# Patient Record
Sex: MALE | Race: WHITE | NOT HISPANIC OR LATINO | URBAN - METROPOLITAN AREA
[De-identification: names, ages, dates, MRNs, and addresses within clinical notes are randomized per-mention and may not be internally consistent; named-entity substitution may affect disease eponyms.]

---

## 2017-05-03 VITALS
HEIGHT: 69 IN | DIASTOLIC BLOOD PRESSURE: 70 MMHG | TEMPERATURE: 98 F | HEART RATE: 47 BPM | SYSTOLIC BLOOD PRESSURE: 167 MMHG | OXYGEN SATURATION: 98 % | WEIGHT: 173.94 LBS | RESPIRATION RATE: 16 BRPM

## 2017-05-03 NOTE — ASU PATIENT PROFILE, ADULT - PMH
BPH (benign prostatic hypertrophy)    HLD (hyperlipidemia)    HTN (hypertension)    Kidney stone BPH (benign prostatic hypertrophy)    HTN (hypertension)    Kidney stone

## 2017-05-04 ENCOUNTER — OUTPATIENT (OUTPATIENT)
Dept: OUTPATIENT SERVICES | Facility: HOSPITAL | Age: 67
LOS: 1 days | Discharge: HOME | End: 2017-05-04
Payer: COMMERCIAL

## 2017-05-04 VITALS
HEART RATE: 59 BPM | DIASTOLIC BLOOD PRESSURE: 67 MMHG | OXYGEN SATURATION: 99 % | RESPIRATION RATE: 14 BRPM | SYSTOLIC BLOOD PRESSURE: 159 MMHG

## 2017-05-04 DIAGNOSIS — Z98.890 OTHER SPECIFIED POSTPROCEDURAL STATES: Chronic | ICD-10-CM

## 2017-05-04 DIAGNOSIS — Z87.442 PERSONAL HISTORY OF URINARY CALCULI: Chronic | ICD-10-CM

## 2017-05-04 PROCEDURE — 86850 RBC ANTIBODY SCREEN: CPT

## 2017-05-04 PROCEDURE — 86900 BLOOD TYPING SEROLOGIC ABO: CPT

## 2017-05-04 PROCEDURE — 88305 TISSUE EXAM BY PATHOLOGIST: CPT

## 2017-05-04 PROCEDURE — 52204 CYSTOSCOPY W/BIOPSY(S): CPT

## 2017-05-04 PROCEDURE — 86901 BLOOD TYPING SEROLOGIC RH(D): CPT

## 2017-05-04 PROCEDURE — 87086 URINE CULTURE/COLONY COUNT: CPT

## 2017-05-04 RX ORDER — SODIUM CHLORIDE 9 MG/ML
1000 INJECTION, SOLUTION INTRAVENOUS
Qty: 0 | Refills: 0 | Status: DISCONTINUED | OUTPATIENT
Start: 2017-05-04 | End: 2017-05-04

## 2017-05-04 RX ORDER — DOCUSATE SODIUM 100 MG
1 CAPSULE ORAL
Qty: 30 | Refills: 0 | OUTPATIENT
Start: 2017-05-04 | End: 2017-05-19

## 2017-05-04 RX ORDER — OXYCODONE HYDROCHLORIDE 5 MG/1
1 TABLET ORAL
Qty: 12 | Refills: 0 | OUTPATIENT
Start: 2017-05-04

## 2017-05-04 RX ORDER — OXYCODONE HYDROCHLORIDE 5 MG/1
5 TABLET ORAL ONCE
Qty: 0 | Refills: 0 | Status: DISCONTINUED | OUTPATIENT
Start: 2017-05-04 | End: 2017-05-04

## 2017-05-04 RX ORDER — AZTREONAM 2 G
1 VIAL (EA) INJECTION
Qty: 10 | Refills: 0 | OUTPATIENT
Start: 2017-05-04 | End: 2017-05-09

## 2017-05-04 NOTE — PACU DISCHARGE NOTE - COMMENTS
discharge instructions given to patient,PACU discharge criteria has met  ortega bag changed to leg bag  emphasized that needs to see primary doctor in am to remove ortega  discharged to lobby via wheelchair with wife as escort

## 2017-05-04 NOTE — BRIEF OPERATIVE NOTE - OPERATION/FINDINGS
small lesions throughout bladder that seemed like fat deposits. 3 lesions biopsied with cold cup biopsy. 10 bugbey used for fulguration

## 2017-05-05 LAB — SURGICAL PATHOLOGY STUDY: SIGNIFICANT CHANGE UP

## 2017-05-06 LAB
CULTURE RESULTS: NO GROWTH — SIGNIFICANT CHANGE UP
SPECIMEN SOURCE: SIGNIFICANT CHANGE UP

## 2017-05-09 DIAGNOSIS — I10 ESSENTIAL (PRIMARY) HYPERTENSION: ICD-10-CM

## 2017-05-09 DIAGNOSIS — Z88.1 ALLERGY STATUS TO OTHER ANTIBIOTIC AGENTS STATUS: ICD-10-CM

## 2017-05-09 DIAGNOSIS — N30.81 OTHER CYSTITIS WITH HEMATURIA: ICD-10-CM

## 2017-05-09 DIAGNOSIS — Z87.442 PERSONAL HISTORY OF URINARY CALCULI: ICD-10-CM

## 2018-05-15 PROBLEM — N40.0 BENIGN PROSTATIC HYPERPLASIA WITHOUT LOWER URINARY TRACT SYMPTOMS: Chronic | Status: ACTIVE | Noted: 2017-05-03

## 2018-05-15 PROBLEM — Z00.00 ENCOUNTER FOR PREVENTIVE HEALTH EXAMINATION: Status: ACTIVE | Noted: 2018-05-15

## 2018-07-17 ENCOUNTER — APPOINTMENT (OUTPATIENT)
Dept: NEPHROLOGY | Facility: CLINIC | Age: 68
End: 2018-07-17

## 2018-07-20 ENCOUNTER — APPOINTMENT (OUTPATIENT)
Dept: HEART AND VASCULAR | Facility: CLINIC | Age: 68
End: 2018-07-20
Payer: COMMERCIAL

## 2018-07-20 VITALS
TEMPERATURE: 97.8 F | WEIGHT: 176.99 LBS | BODY MASS INDEX: 26.52 KG/M2 | SYSTOLIC BLOOD PRESSURE: 136 MMHG | HEIGHT: 68.31 IN | HEART RATE: 52 BPM | OXYGEN SATURATION: 98 % | DIASTOLIC BLOOD PRESSURE: 74 MMHG

## 2018-07-20 DIAGNOSIS — Z78.9 OTHER SPECIFIED HEALTH STATUS: ICD-10-CM

## 2018-07-20 DIAGNOSIS — Z82.49 FAMILY HISTORY OF ISCHEMIC HEART DISEASE AND OTHER DISEASES OF THE CIRCULATORY SYSTEM: ICD-10-CM

## 2018-07-20 DIAGNOSIS — Z85.828 PERSONAL HISTORY OF OTHER MALIGNANT NEOPLASM OF SKIN: ICD-10-CM

## 2018-07-20 DIAGNOSIS — Z86.79 PERSONAL HISTORY OF OTHER DISEASES OF THE CIRCULATORY SYSTEM: ICD-10-CM

## 2018-07-20 DIAGNOSIS — Z13.89 ENCOUNTER FOR SCREENING FOR OTHER DISORDER: ICD-10-CM

## 2018-07-20 DIAGNOSIS — G43.109 MIGRAINE WITH AURA, NOT INTRACTABLE, W/OUT STATUS MIGRAINOSUS: ICD-10-CM

## 2018-07-20 DIAGNOSIS — Z82.3 FAMILY HISTORY OF STROKE: ICD-10-CM

## 2018-07-20 DIAGNOSIS — Z83.3 FAMILY HISTORY OF DIABETES MELLITUS: ICD-10-CM

## 2018-07-20 PROCEDURE — 99204 OFFICE O/P NEW MOD 45 MIN: CPT | Mod: 25

## 2018-07-20 PROCEDURE — 93000 ELECTROCARDIOGRAM COMPLETE: CPT

## 2018-10-02 ENCOUNTER — APPOINTMENT (OUTPATIENT)
Dept: HEART AND VASCULAR | Facility: CLINIC | Age: 68
End: 2018-10-02
Payer: COMMERCIAL

## 2018-10-02 VITALS
HEIGHT: 68.31 IN | WEIGHT: 179.99 LBS | OXYGEN SATURATION: 97 % | SYSTOLIC BLOOD PRESSURE: 120 MMHG | BODY MASS INDEX: 26.97 KG/M2 | DIASTOLIC BLOOD PRESSURE: 60 MMHG | HEART RATE: 56 BPM | TEMPERATURE: 98.7 F

## 2018-10-02 DIAGNOSIS — E78.6 LIPOPROTEIN DEFICIENCY: ICD-10-CM

## 2018-10-02 DIAGNOSIS — F98.8 OTHER SPECIFIED BEHAVIORAL AND EMOTIONAL DISORDERS WITH ONSET USUALLY OCCURRING IN CHILDHOOD AND ADOLESCENCE: ICD-10-CM

## 2018-10-02 PROCEDURE — 99214 OFFICE O/P EST MOD 30 MIN: CPT

## 2018-10-02 RX ORDER — DEXTROAMPHETAMINE SACCHARATE, AMPHETAMINE ASPARTATE, DEXTROAMPHETAMINE SULFATE AND AMPHETAMINE SULFATE 2.5; 2.5; 2.5; 2.5 MG/1; MG/1; MG/1; MG/1
10 TABLET ORAL
Refills: 0 | Status: ACTIVE | COMMUNITY
Start: 2018-03-26

## 2019-01-08 ENCOUNTER — APPOINTMENT (OUTPATIENT)
Dept: HEART AND VASCULAR | Facility: CLINIC | Age: 69
End: 2019-01-08
Payer: COMMERCIAL

## 2019-01-08 PROCEDURE — 93306 TTE W/DOPPLER COMPLETE: CPT

## 2019-01-11 ENCOUNTER — APPOINTMENT (OUTPATIENT)
Dept: HEART AND VASCULAR | Facility: CLINIC | Age: 69
End: 2019-01-11

## 2019-01-15 ENCOUNTER — APPOINTMENT (OUTPATIENT)
Dept: HEART AND VASCULAR | Facility: CLINIC | Age: 69
End: 2019-01-15

## 2019-01-18 ENCOUNTER — APPOINTMENT (OUTPATIENT)
Dept: HEART AND VASCULAR | Facility: CLINIC | Age: 69
End: 2019-01-18

## 2019-01-22 ENCOUNTER — APPOINTMENT (OUTPATIENT)
Dept: HEART AND VASCULAR | Facility: CLINIC | Age: 69
End: 2019-01-22
Payer: COMMERCIAL

## 2019-01-22 VITALS
DIASTOLIC BLOOD PRESSURE: 60 MMHG | TEMPERATURE: 97.7 F | SYSTOLIC BLOOD PRESSURE: 110 MMHG | HEIGHT: 68.31 IN | HEART RATE: 56 BPM | OXYGEN SATURATION: 98 % | BODY MASS INDEX: 26.67 KG/M2 | WEIGHT: 178 LBS

## 2019-01-22 PROCEDURE — 99214 OFFICE O/P EST MOD 30 MIN: CPT

## 2019-01-22 NOTE — ASSESSMENT
[FreeTextEntry1] : AI- Mild to moderate in 1/2014, now reported as severe in 6/26/2018 with mild MR and a thickened/sclerotic aortic valve, MARY 28, EF 55-60. f/u echo at 6 mos reveals probably mild or mild/mod AI\par \par HTN- very labile, will add Amlodipine 2.5mg/Day. Some literature favors use of a dihyropyridine for AI.  Tapering up to 5mg  . Will aim for very tight BP control.  Pt admits to an anxiety component and likes the BID dosing of the Labetolol.\par \par HLD- Continue Pravachol, h/o muscle cramps on Lipitor and Crestor but not a h/o myositis.  On Pravachol 20mg TC 97, HDL 35, TG 72 and LDL 48.\par \par ASHD- CCS 2007 was 85, 64th %ile.  CTA revealed mild narrowing of the Cx and RCA.  Will eventually have pt perform a stress test.  Continue Pravachol

## 2019-01-22 NOTE — HISTORY OF PRESENT ILLNESS
[FreeTextEntry1] : PCP- Dr Griffin Aguilera\par Neuro- Dr Doroteo Bustos\par Derm- Dr Valladares\par - None at present

## 2019-01-22 NOTE — PHYSICAL EXAM
[General Appearance - Well Developed] : well developed [Normal Appearance] : normal appearance [Well Groomed] : well groomed [General Appearance - Well Nourished] : well nourished [No Deformities] : no deformities [General Appearance - In No Acute Distress] : no acute distress [Normal Conjunctiva] : the conjunctiva exhibited no abnormalities [Eyelids - No Xanthelasma] : the eyelids demonstrated no xanthelasmas [Normal Oral Mucosa] : normal oral mucosa [No Oral Pallor] : no oral pallor [No Oral Cyanosis] : no oral cyanosis [Respiration, Rhythm And Depth] : normal respiratory rhythm and effort [Exaggerated Use Of Accessory Muscles For Inspiration] : no accessory muscle use [Auscultation Breath Sounds / Voice Sounds] : lungs were clear to auscultation bilaterally [Heart Rate And Rhythm] : heart rate and rhythm were normal [Heart Sounds] : normal S1 and S2 [Murmurs] : no murmurs present [Abdomen Soft] : soft [Abdomen Tenderness] : non-tender [Abdomen Mass (___ Cm)] : no abdominal mass palpated [Abnormal Walk] : normal gait [Gait - Sufficient For Exercise Testing] : the gait was sufficient for exercise testing [Nail Clubbing] : no clubbing of the fingernails [Cyanosis, Localized] : no localized cyanosis [Petechial Hemorrhages (___cm)] : no petechial hemorrhages [Skin Color & Pigmentation] : normal skin color and pigmentation [] : no rash [No Venous Stasis] : no venous stasis [Skin Lesions] : no skin lesions [No Skin Ulcers] : no skin ulcer [No Xanthoma] : no  xanthoma was observed [Oriented To Time, Place, And Person] : oriented to person, place, and time [Affect] : the affect was normal [Mood] : the mood was normal [No Anxiety] : not feeling anxious [FreeTextEntry1] : Peripheral pulses easy to feel but I would not describe as bounding, no Quincke's pulses

## 2019-01-22 NOTE — REASON FOR VISIT
[FreeTextEntry1] : 3 issues:\par \par 1-Severe AI on an echo from 6/28/18. There is an echo from 1/2014 done by Dr Sellers that reveals mild to moderate AI.\par                                 2014 6/2018 1/2019\par EF                              60                          55-60                        60-65\par LV                            42/31                      50/33                         48/30  \par AI                           mild-mod                   severe                     mild-mod \par PHT                              -                          799                             590 \par \par 2-HTN is very labile with an emotional/stress component\par 3-ASHD documented on a CTA from 2007, CCS was 85, 64th %ile with mild RCA and Cx dz\par \par EKG: NSR, normal axis and intervals, possible LVH by lead I, diffuse J pt elevation most likely a NL variant.  1/22/19

## 2019-06-03 ENCOUNTER — APPOINTMENT (OUTPATIENT)
Dept: NEUROLOGY | Facility: CLINIC | Age: 69
End: 2019-06-03

## 2019-07-18 ENCOUNTER — APPOINTMENT (OUTPATIENT)
Dept: HEART AND VASCULAR | Facility: CLINIC | Age: 69
End: 2019-07-18
Payer: COMMERCIAL

## 2019-07-18 VITALS
HEIGHT: 68.31 IN | OXYGEN SATURATION: 98 % | DIASTOLIC BLOOD PRESSURE: 60 MMHG | TEMPERATURE: 98.4 F | WEIGHT: 173.99 LBS | HEART RATE: 61 BPM | SYSTOLIC BLOOD PRESSURE: 130 MMHG | BODY MASS INDEX: 26.07 KG/M2

## 2019-07-18 PROCEDURE — 93306 TTE W/DOPPLER COMPLETE: CPT

## 2019-07-18 PROCEDURE — 99214 OFFICE O/P EST MOD 30 MIN: CPT

## 2019-07-18 PROCEDURE — 93000 ELECTROCARDIOGRAM COMPLETE: CPT

## 2019-07-18 NOTE — HISTORY OF PRESENT ILLNESS
[FreeTextEntry1] : PCP- Pepe Skinner\par Neuro- Dr Doroteo Bustos\par Derm- Dr Valladares\aan - None at present

## 2019-07-18 NOTE — REASON FOR VISIT
[FreeTextEntry1] : 3 issues:\par \par 1-Severe AI on an echo from 6/28/18. There is an echo from 1/2014 done by Dr Sellers that reveals mild to moderate AI.\par                                 2014 6/2018 1/2019\par EF                              60                          55-60                        60-65\par LV                            42/31                      50/33                         48/30  \par AI                           mild-mod                   severe                     mild-mod \par PHT                              -                          799                             590 \par \par 2-HTN is very labile with an emotional/stress component\par 3-ASHD documented on a CTA from 2007, CCS was 85, 64th %ile with mild RCA and Cx dz\par \par EKG: NSR, normal axis and intervals, possible LVH by lead I, diffuse J pt elevation most likely a NL variant.  7/18/19

## 2019-07-18 NOTE — ASSESSMENT
[FreeTextEntry1] : AI- Mild to moderate in 1/2014, now reported as severe in 6/26/2018 with mild MR and a thickened/sclerotic aortic valve, MARY 28, EF 55-60. f/u echo at 6 mos reveals probably mild or mild/mod AI.  Echo 7/18/19 mild AI\par \par HTN- very labile, will add Amlodipine 2.5mg/Day. Some literature favors use of a dihydropyridine for AI.  Tapering up to 5mg  . Will aim for very tight BP control. Now on 10mg.  Pt admits to an anxiety component and likes the BID dosing of the Labetolol. All meds renewed\par \par HLD- Continue Pravachol, h/o muscle cramps on Lipitor and Crestor but not a h/o myositis.  On Pravachol 20mg TC 97, HDL 35, TG 72 and LDL 48.\par \par ASHD- CCS 2007 was 85, 64th %ile.  CTA revealed mild narrowing of the Cx and RCA.  Will eventually have pt perform a stress test.  Continue Pravachol.  Stress echo ordered.

## 2019-10-03 ENCOUNTER — APPOINTMENT (OUTPATIENT)
Dept: NEUROLOGY | Facility: CLINIC | Age: 69
End: 2019-10-03
Payer: COMMERCIAL

## 2019-10-03 PROCEDURE — 96136 PSYCL/NRPSYC TST PHY/QHP 1ST: CPT

## 2019-10-03 PROCEDURE — 96137 PSYCL/NRPSYC TST PHY/QHP EA: CPT

## 2019-10-03 PROCEDURE — 96133 NRPSYC TST EVAL PHYS/QHP EA: CPT

## 2019-10-03 PROCEDURE — 96132 NRPSYC TST EVAL PHYS/QHP 1ST: CPT

## 2019-10-03 PROCEDURE — 90791 PSYCH DIAGNOSTIC EVALUATION: CPT

## 2019-11-07 ENCOUNTER — APPOINTMENT (OUTPATIENT)
Dept: NEUROLOGY | Facility: CLINIC | Age: 69
End: 2019-11-07
Payer: COMMERCIAL

## 2019-11-07 PROCEDURE — 90837 PSYTX W PT 60 MINUTES: CPT

## 2020-06-23 ENCOUNTER — APPOINTMENT (OUTPATIENT)
Dept: HEART AND VASCULAR | Facility: CLINIC | Age: 70
End: 2020-06-23

## 2020-06-23 ENCOUNTER — APPOINTMENT (OUTPATIENT)
Dept: HEART AND VASCULAR | Facility: CLINIC | Age: 70
End: 2020-06-23
Payer: COMMERCIAL

## 2020-06-23 ENCOUNTER — NON-APPOINTMENT (OUTPATIENT)
Age: 70
End: 2020-06-23

## 2020-06-23 VITALS
DIASTOLIC BLOOD PRESSURE: 70 MMHG | BODY MASS INDEX: 24.72 KG/M2 | HEIGHT: 68.31 IN | WEIGHT: 164.99 LBS | SYSTOLIC BLOOD PRESSURE: 150 MMHG | HEART RATE: 82 BPM | TEMPERATURE: 98.2 F | OXYGEN SATURATION: 97 %

## 2020-06-23 PROCEDURE — 99214 OFFICE O/P EST MOD 30 MIN: CPT

## 2020-06-23 PROCEDURE — 93000 ELECTROCARDIOGRAM COMPLETE: CPT

## 2020-06-23 NOTE — REASON FOR VISIT
[FreeTextEntry1] : 3 issues:\par \par 1-Severe AI on an echo from 6/28/18. There is an echo from 1/2014 done by Dr Sellers that reveals mild to moderate AI.\par                                 2014 6/2018 1/2019\par EF                              60                          55-60                        60-65\par LV                            42/31                      50/33                         48/30  \par AI                           mild-mod                   severe                     mild-mod \par PHT                              -                          799                             590 \par \par 2-HTN is very labile with an emotional/stress component\par 3-ASHD documented on a CTA from 2007, CCS was 85, 64th %ile with mild RCA and Cx dz\par \par 6/23/20 declining functional status, harder biking. Dizziness when getting up. Mild ankle edema.\par Not working since March 29, 2020.  If patient were to return to work he would be at increased risk of Covid complications should he contract it.  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  Also, he is 70 years of age.\par \par EKG: NSR, normal axis and intervals, possible LVH by lead I, diffuse J pt elevation most likely a NL variant.  7/18/19, 6/23/20

## 2020-06-23 NOTE — PHYSICAL EXAM
[General Appearance - Well Developed] : well developed [Normal Appearance] : normal appearance [Well Groomed] : well groomed [No Deformities] : no deformities [General Appearance - Well Nourished] : well nourished [General Appearance - In No Acute Distress] : no acute distress [Normal Conjunctiva] : the conjunctiva exhibited no abnormalities [Eyelids - No Xanthelasma] : the eyelids demonstrated no xanthelasmas [No Oral Cyanosis] : no oral cyanosis [Respiration, Rhythm And Depth] : normal respiratory rhythm and effort [Exaggerated Use Of Accessory Muscles For Inspiration] : no accessory muscle use [Auscultation Breath Sounds / Voice Sounds] : lungs were clear to auscultation bilaterally [Heart Rate And Rhythm] : heart rate and rhythm were normal [Heart Sounds] : normal S1 and S2 [Murmurs] : no murmurs present [Abdomen Tenderness] : non-tender [Abdomen Soft] : soft [Abdomen Mass (___ Cm)] : no abdominal mass palpated [Abnormal Walk] : normal gait [Nail Clubbing] : no clubbing of the fingernails [Gait - Sufficient For Exercise Testing] : the gait was sufficient for exercise testing [Petechial Hemorrhages (___cm)] : no petechial hemorrhages [Cyanosis, Localized] : no localized cyanosis [Skin Color & Pigmentation] : normal skin color and pigmentation [] : no rash [No Venous Stasis] : no venous stasis [Skin Lesions] : no skin lesions [No Skin Ulcers] : no skin ulcer [No Xanthoma] : no  xanthoma was observed [Oriented To Time, Place, And Person] : oriented to person, place, and time [Affect] : the affect was normal [Mood] : the mood was normal [No Anxiety] : not feeling anxious [FreeTextEntry1] : Peripheral pulses easy to feel but I would not describe as bounding, no Quincke's pulses,

## 2020-06-23 NOTE — HISTORY OF PRESENT ILLNESS
[FreeTextEntry1] : PCP- Pepe Skinner\par Neuro- Dr Doroteo Bustos\par Derm- Dr aVlladares\ana - None at present

## 2020-06-23 NOTE — ASSESSMENT
[FreeTextEntry1] : AI- Mild to moderate in 1/2014, now reported as severe in 6/26/2018 with mild MR and a thickened/sclerotic aortic valve, MARY 28, EF 55-60. f/u echo at 6 mos reveals probably mild or mild/mod AI.  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  Pt to pursue this and I agree..\par \par HTN- very labile, will add Amlodipine 2.5mg/Day. Some literature favors use of a dihydropyridine for AI.  Tapering up to 5mg  . Will aim for very tight BP control. Now on 10mg.  Pt admits to an anxiety component and likes the BID dosing of the Labetolol 100mg. All meds renewed\par \par HLD- Continue Pravachol, h/o muscle cramps on Lipitor and Crestor but not a h/o myositis.  On Pravachol 20mg TC 97, HDL 35, TG 72 and LDL 48.\par \par ASHD- CCS 2007 was 85, 64th %ile.  CTA revealed mild narrowing of the Cx and RCA.  Will eventually have pt perform a stress test.  Continue Pravachol.  Stress echo ordered.

## 2020-08-10 ENCOUNTER — APPOINTMENT (OUTPATIENT)
Dept: HEART AND VASCULAR | Facility: CLINIC | Age: 70
End: 2020-08-10

## 2020-09-14 ENCOUNTER — APPOINTMENT (OUTPATIENT)
Dept: HEART AND VASCULAR | Facility: CLINIC | Age: 70
End: 2020-09-14

## 2021-12-02 ENCOUNTER — NON-APPOINTMENT (OUTPATIENT)
Age: 71
End: 2021-12-02

## 2021-12-07 ENCOUNTER — NON-APPOINTMENT (OUTPATIENT)
Age: 71
End: 2021-12-07

## 2021-12-07 ENCOUNTER — APPOINTMENT (OUTPATIENT)
Dept: HEART AND VASCULAR | Facility: CLINIC | Age: 71
End: 2021-12-07
Payer: MEDICARE

## 2021-12-07 VITALS
BODY MASS INDEX: 24.25 KG/M2 | WEIGHT: 160 LBS | HEART RATE: 63 BPM | OXYGEN SATURATION: 99 % | HEIGHT: 68 IN | SYSTOLIC BLOOD PRESSURE: 150 MMHG | TEMPERATURE: 97.8 F | DIASTOLIC BLOOD PRESSURE: 76 MMHG

## 2021-12-07 PROCEDURE — 93000 ELECTROCARDIOGRAM COMPLETE: CPT

## 2021-12-07 PROCEDURE — 36415 COLL VENOUS BLD VENIPUNCTURE: CPT

## 2021-12-07 PROCEDURE — 99213 OFFICE O/P EST LOW 20 MIN: CPT

## 2021-12-07 RX ORDER — FLUOROURACIL 50 MG/G
5 CREAM TOPICAL
Qty: 40 | Refills: 0 | Status: DISCONTINUED | COMMUNITY
Start: 2018-06-12 | End: 2021-12-07

## 2021-12-07 RX ORDER — CHLORHEXIDINE GLUCONATE, 0.12% ORAL RINSE 1.2 MG/ML
0.12 SOLUTION DENTAL
Qty: 473 | Refills: 0 | Status: DISCONTINUED | COMMUNITY
Start: 2018-02-09 | End: 2021-12-07

## 2021-12-07 NOTE — HISTORY OF PRESENT ILLNESS
[FreeTextEntry1] : PCP- Pepe Skinner\par Neuro- Dr Doroteo Bustos\par Derm- Dr Valladares\ana - None at present

## 2021-12-07 NOTE — REASON FOR VISIT
[FreeTextEntry1] : 3 issues:\par \par 1-Severe AI on an echo from 6/28/18. There is an echo from 1/2014 done by Dr Sellers that reveals mild to moderate AI.\par                                 2014 6/2018 1/2019\par EF                              60                          55-60                        60-65\par LV                            42/31                      50/33                         48/30  \par AI                           mild-mod                   severe                     mild-mod \par PHT                              -                          799                             590 \par \par 2-HTN is very labile with an emotional/stress component\par 3-ASHD documented on a CTA from 2007, CCS was 85, 64th %ile with mild RCA and Cx dz\par \par 6/23/20 declining functional status, harder biking. Dizziness when getting up. Mild ankle edema.\par Not working since March 29, 2020.  If patient were to return to work he would be at increased risk of Covid complications should he contract it.  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  Also, he is 70 years of age.\par 12/7/21  Pt has tapered a lot of BP meds down.  Amlodipine is 5 mg and Labetolol is 50 qD\par \par EKG: NSR, normal axis and intervals, possible LVH by lead I, diffuse J pt elevation most likely a NL variant.  7/18/19, 6/23/20

## 2021-12-07 NOTE — ASSESSMENT
[FreeTextEntry1] : AI- Mild to moderate in 1/2014, now reported as severe in 6/26/2018 with mild MR and a thickened/sclerotic aortic valve, MARY 28, EF 55-60. f/u echo at 6 mos reveals probably mild or mild/mod AI.  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  Pt to pursue this and I agree..\par \par HTN- very labile, will add Amlodipine 2.5mg/Day. Some literature favors use of a dihydropyridine for AI.  Tapering up to 5mg  . Will aim for very tight BP control. Now on 10mg.  Pt admits to an anxiety component and likes the BID dosing of the Labetolol 100mg. All meds renewed\par \par HLD- Continue Pravachol, h/o muscle cramps on Lipitor and Crestor but not a h/o myositis.  On Pravachol 20mg TC 97, HDL 35, TG 72 and LDL 48.  Labs were drawn today in Office. \par \par ASHD- CCS 2007 was 85, 64th %ile.  CTA revealed mild narrowing of the Cx and RCA.  Will eventually have pt perform a stress test.  Continue Pravachol.  Stress echo ordered.  Not done.  Update CCS to be followed by a stress test.

## 2021-12-07 NOTE — PHYSICAL EXAM
[General Appearance - Well Developed] : well developed [Normal Appearance] : normal appearance [Well Groomed] : well groomed [General Appearance - Well Nourished] : well nourished [No Deformities] : no deformities [General Appearance - In No Acute Distress] : no acute distress [Normal Conjunctiva] : the conjunctiva exhibited no abnormalities [Eyelids - No Xanthelasma] : the eyelids demonstrated no xanthelasmas [No Oral Cyanosis] : no oral cyanosis [Respiration, Rhythm And Depth] : normal respiratory rhythm and effort [Exaggerated Use Of Accessory Muscles For Inspiration] : no accessory muscle use [Auscultation Breath Sounds / Voice Sounds] : lungs were clear to auscultation bilaterally [Heart Rate And Rhythm] : heart rate and rhythm were normal [Heart Sounds] : normal S1 and S2 [Murmurs] : no murmurs present [Abdomen Soft] : soft [Abdomen Tenderness] : non-tender [Abdomen Mass (___ Cm)] : no abdominal mass palpated [Abnormal Walk] : normal gait [Gait - Sufficient For Exercise Testing] : the gait was sufficient for exercise testing [Nail Clubbing] : no clubbing of the fingernails [Cyanosis, Localized] : no localized cyanosis [Petechial Hemorrhages (___cm)] : no petechial hemorrhages [Skin Color & Pigmentation] : normal skin color and pigmentation [] : no rash [No Venous Stasis] : no venous stasis [Skin Lesions] : no skin lesions [No Skin Ulcers] : no skin ulcer [No Xanthoma] : no  xanthoma was observed [Oriented To Time, Place, And Person] : oriented to person, place, and time [Affect] : the affect was normal [Mood] : the mood was normal [No Anxiety] : not feeling anxious [FreeTextEntry1] : Peripheral pulses easy to feel but I would not describe as bounding, no Quincke's pulses,

## 2021-12-09 LAB
ALBUMIN SERPL ELPH-MCNC: 4.9 G/DL
ALP BLD-CCNC: 71 U/L
ALT SERPL-CCNC: 19 U/L
ANION GAP SERPL CALC-SCNC: 11 MMOL/L
APPEARANCE: CLEAR
AST SERPL-CCNC: 23 U/L
BACTERIA: NEGATIVE
BASOPHILS # BLD AUTO: 0.04 K/UL
BASOPHILS NFR BLD AUTO: 0.5 %
BILIRUB SERPL-MCNC: 0.8 MG/DL
BILIRUBIN URINE: NEGATIVE
BLOOD URINE: NEGATIVE
BUN SERPL-MCNC: 20 MG/DL
CALCIUM SERPL-MCNC: 10.2 MG/DL
CHLORIDE SERPL-SCNC: 107 MMOL/L
CHOLEST SERPL-MCNC: 122 MG/DL
CO2 SERPL-SCNC: 29 MMOL/L
COLOR: NORMAL
CREAT SERPL-MCNC: 0.93 MG/DL
EOSINOPHIL # BLD AUTO: 0.13 K/UL
EOSINOPHIL NFR BLD AUTO: 1.6 %
ESTIMATED AVERAGE GLUCOSE: 114 MG/DL
GLUCOSE QUALITATIVE U: NEGATIVE
GLUCOSE SERPL-MCNC: 104 MG/DL
HBA1C MFR BLD HPLC: 5.6 %
HCT VFR BLD CALC: 46.2 %
HDLC SERPL-MCNC: 39 MG/DL
HGB BLD-MCNC: 15 G/DL
HYALINE CASTS: 0 /LPF
IMM GRANULOCYTES NFR BLD AUTO: 0.3 %
KETONES URINE: NEGATIVE
LDLC SERPL CALC-MCNC: 69 MG/DL
LEUKOCYTE ESTERASE URINE: NEGATIVE
LYMPHOCYTES # BLD AUTO: 3.25 K/UL
LYMPHOCYTES NFR BLD AUTO: 41.1 %
MAN DIFF?: NORMAL
MCHC RBC-ENTMCNC: 31.9 PG
MCHC RBC-ENTMCNC: 32.5 GM/DL
MCV RBC AUTO: 98.3 FL
MICROSCOPIC-UA: NORMAL
MONOCYTES # BLD AUTO: 0.58 K/UL
MONOCYTES NFR BLD AUTO: 7.3 %
NEUTROPHILS # BLD AUTO: 3.88 K/UL
NEUTROPHILS NFR BLD AUTO: 49.2 %
NITRITE URINE: NEGATIVE
NONHDLC SERPL-MCNC: 83 MG/DL
PH URINE: 5.5
PLATELET # BLD AUTO: 242 K/UL
POTASSIUM SERPL-SCNC: 5.4 MMOL/L
PROT SERPL-MCNC: 8 G/DL
PROTEIN URINE: NEGATIVE
PSA SERPL-MCNC: 0.51 NG/ML
RBC # BLD: 4.7 M/UL
RBC # FLD: 12.3 %
RED BLOOD CELLS URINE: 1 /HPF
SODIUM SERPL-SCNC: 146 MMOL/L
SPECIFIC GRAVITY URINE: 1.02
SQUAMOUS EPITHELIAL CELLS: 0 /HPF
TRIGL SERPL-MCNC: 73 MG/DL
UROBILINOGEN URINE: NORMAL
WBC # FLD AUTO: 7.9 K/UL
WHITE BLOOD CELLS URINE: 0 /HPF

## 2022-01-12 ENCOUNTER — APPOINTMENT (OUTPATIENT)
Dept: HEART AND VASCULAR | Facility: CLINIC | Age: 72
End: 2022-01-12

## 2022-03-07 ENCOUNTER — OUTPATIENT (OUTPATIENT)
Dept: OUTPATIENT SERVICES | Facility: HOSPITAL | Age: 72
LOS: 1 days | End: 2022-03-07
Payer: SELF-PAY

## 2022-03-07 ENCOUNTER — APPOINTMENT (OUTPATIENT)
Dept: CT IMAGING | Facility: HOSPITAL | Age: 72
End: 2022-03-07

## 2022-03-07 ENCOUNTER — RESULT REVIEW (OUTPATIENT)
Age: 72
End: 2022-03-07

## 2022-03-07 DIAGNOSIS — Z98.890 OTHER SPECIFIED POSTPROCEDURAL STATES: Chronic | ICD-10-CM

## 2022-03-07 DIAGNOSIS — Z87.442 PERSONAL HISTORY OF URINARY CALCULI: Chronic | ICD-10-CM

## 2022-03-07 PROCEDURE — 75571 CT HRT W/O DYE W/CA TEST: CPT

## 2022-03-07 PROCEDURE — 75571 CT HRT W/O DYE W/CA TEST: CPT | Mod: 26

## 2022-03-08 ENCOUNTER — APPOINTMENT (OUTPATIENT)
Dept: HEART AND VASCULAR | Facility: CLINIC | Age: 72
End: 2022-03-08
Payer: MEDICARE

## 2022-03-08 VITALS
SYSTOLIC BLOOD PRESSURE: 132 MMHG | TEMPERATURE: 97.5 F | HEIGHT: 68 IN | BODY MASS INDEX: 24.4 KG/M2 | HEART RATE: 62 BPM | WEIGHT: 161 LBS | OXYGEN SATURATION: 97 % | DIASTOLIC BLOOD PRESSURE: 70 MMHG

## 2022-03-08 PROCEDURE — 99214 OFFICE O/P EST MOD 30 MIN: CPT

## 2022-03-08 NOTE — PHYSICAL EXAM
[Well Developed] : well developed [Well Nourished] : well nourished [No Acute Distress] : no acute distress [Normal Conjunctiva] : normal conjunctiva [Normal Venous Pressure] : normal venous pressure [No Carotid Bruit] : no carotid bruit [Normal S1, S2] : normal S1, S2 [No Rub] : no rub [No Gallop] : no gallop [Clear Lung Fields] : clear lung fields [Good Air Entry] : good air entry [No Respiratory Distress] : no respiratory distress  [Soft] : abdomen soft [Non Tender] : non-tender [No Masses/organomegaly] : no masses/organomegaly [Normal Bowel Sounds] : normal bowel sounds [Normal Gait] : normal gait [No Edema] : no edema [No Cyanosis] : no cyanosis [No Clubbing] : no clubbing [No Varicosities] : no varicosities [No Rash] : no rash [No Skin Lesions] : no skin lesions [Moves all extremities] : moves all extremities [No Focal Deficits] : no focal deficits [Normal Speech] : normal speech [Alert and Oriented] : alert and oriented [Normal memory] : normal memory [de-identified] : 2/6 SEVEN, NO PACHECO MURMUR [de-identified] : NOT BOUNDING PULSES

## 2022-03-08 NOTE — REASON FOR VISIT
[FreeTextEntry1] : 3 issues:\par \par 1-Severe AI on an echo from 6/28/18. There is an echo from 1/2014 done by Dr Sellers that reveals mild to moderate AI.\par                                 2014 6/2018 1/2019\par EF                              60                          55-60                        60-65\par LV                            42/31                      50/33                         48/30  \par AI                           mild-mod                   severe                     mild-mod \par PHT                              -                          799                             590 \par \par 2-HTN is very labile with an emotional/stress component\par 3-ASHD documented on a CTA from 2007, CCS was 85, 64th %ile with mild RCA and Cx dz\par \par 6/23/20 declining functional status, harder biking. Dizziness when getting up. Mild ankle edema.\par Not working since March 29, 2020.  If patient were to return to work he would be at increased risk of Covid complications should he contract it.  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  Also, he is 70 years of age.\par 12/7/21  Pt has tapered a lot of BP meds down.  Amlodipine is 5 mg and Labetolol is 50 qD\par 3/8/22 LDL 69, A1c 5.6, new CCS 1770 !!  He takes 2.5 Amlodipine.\par \par EKG: NSR, normal axis and intervals, possible LVH by lead I, diffuse J pt elevation most likely a NL variant.  7/18/19, 6/23/20

## 2022-03-08 NOTE — ASSESSMENT
[FreeTextEntry1] : AI- Mild to moderate in 1/2014, now reported as severe in 6/26/2018 with mild MR and a thickened/sclerotic aortic valve, MARY 28, EF 55-60. f/u echo at 6 mos reveals probably mild or mild/mod AI.  Echo 7/18/19 mild AI.  Update echo in 2022.. 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  Pt to pursue this and I agree. HE HAS NOW RETIRED..\par \par HTN- very labile, will add Amlodipine 2.5mg/Day. Some literature favors use of a dihydropyridine for AI.  Tapering up to 5mg  . Will aim for very tight BP control. Now on 10mg.  Pt admits to an anxiety component and likes the BID dosing of the Labetolol 100mg. All meds renewed.  Home readings good. Takes 50 BID + Norvasc 2.5 + Losartan 100 mg.  Pt to record home readings.\par \par HLD- Continue Pravachol, h/o muscle cramps on Lipitor and Crestor but not a h/o myositis.  On Pravachol 20mg TC 97, HDL 35, TG 72 and LDL 48.  LDL 69 Dec 2021.\par \par ASHD- CCS 2007 was 85, 64th %ile.  CTA revealed mild narrowing of the Cx and RCA.  Will eventually have pt perform a stress test.  Continue Pravachol.  Stress echo ordered.  Not done.  Update CCS to be followed by a stress test. New CCS = 1770, 92nd %ile.  Resume ASA 81 mg. Nuclear stress test ordered.

## 2022-03-21 ENCOUNTER — FORM ENCOUNTER (OUTPATIENT)
Age: 72
End: 2022-03-21

## 2022-03-22 ENCOUNTER — RESULT REVIEW (OUTPATIENT)
Age: 72
End: 2022-03-22

## 2022-03-22 ENCOUNTER — OUTPATIENT (OUTPATIENT)
Dept: OUTPATIENT SERVICES | Facility: HOSPITAL | Age: 72
LOS: 1 days | End: 2022-03-22
Payer: MEDICARE

## 2022-03-22 DIAGNOSIS — I25.10 ATHEROSCLEROTIC HEART DISEASE OF NATIVE CORONARY ARTERY WITHOUT ANGINA PECTORIS: ICD-10-CM

## 2022-03-22 DIAGNOSIS — Z87.442 PERSONAL HISTORY OF URINARY CALCULI: Chronic | ICD-10-CM

## 2022-03-22 DIAGNOSIS — Z98.890 OTHER SPECIFIED POSTPROCEDURAL STATES: Chronic | ICD-10-CM

## 2022-03-22 PROCEDURE — 93018 CV STRESS TEST I&R ONLY: CPT

## 2022-03-22 PROCEDURE — 93016 CV STRESS TEST SUPVJ ONLY: CPT

## 2022-03-22 PROCEDURE — 93017 CV STRESS TEST TRACING ONLY: CPT

## 2022-03-22 PROCEDURE — 78452 HT MUSCLE IMAGE SPECT MULT: CPT

## 2022-03-22 PROCEDURE — A9500: CPT

## 2022-03-22 PROCEDURE — 78452 HT MUSCLE IMAGE SPECT MULT: CPT | Mod: 26,MH

## 2022-03-22 PROCEDURE — A9505: CPT

## 2022-03-24 ENCOUNTER — APPOINTMENT (OUTPATIENT)
Dept: HEART AND VASCULAR | Facility: CLINIC | Age: 72
End: 2022-03-24
Payer: MEDICARE

## 2022-03-24 PROCEDURE — 93306 TTE W/DOPPLER COMPLETE: CPT

## 2023-03-27 ENCOUNTER — APPOINTMENT (OUTPATIENT)
Dept: HEART AND VASCULAR | Facility: CLINIC | Age: 73
End: 2023-03-27
Payer: MEDICARE

## 2023-03-27 VITALS
SYSTOLIC BLOOD PRESSURE: 168 MMHG | BODY MASS INDEX: 25.01 KG/M2 | HEART RATE: 57 BPM | TEMPERATURE: 98.2 F | DIASTOLIC BLOOD PRESSURE: 92 MMHG | WEIGHT: 165.04 LBS | HEIGHT: 68 IN | OXYGEN SATURATION: 98 %

## 2023-03-27 PROCEDURE — 99214 OFFICE O/P EST MOD 30 MIN: CPT

## 2023-03-27 PROCEDURE — 93000 ELECTROCARDIOGRAM COMPLETE: CPT

## 2023-03-27 RX ORDER — LOSARTAN POTASSIUM 100 MG/1
100 TABLET, FILM COATED ORAL
Qty: 90 | Refills: 3 | Status: DISCONTINUED | COMMUNITY
Start: 2017-11-30 | End: 2023-03-27

## 2023-03-27 RX ORDER — AMLODIPINE BESYLATE 5 MG/1
5 TABLET ORAL
Qty: 90 | Refills: 3 | Status: DISCONTINUED | COMMUNITY
End: 2023-03-27

## 2023-03-27 NOTE — ASSESSMENT
[FreeTextEntry1] : AI- Mild to moderate in 1/2014, now reported as severe in 6/26/2018 with mild MR and a thickened/sclerotic aortic valve, MARY 28, EF 55-60. f/u echo at 6 mos reveals probably mild or mild/mod AI.  Echo 7/18/19 mild AI.  Update echo in 2022..  Mild to moderate AI. 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  Pt to pursue this and I agree. HE HAS NOW RETIRED..\par \par HTN- very labile, will add Amlodipine 2.5mg/Day. Some literature favors use of a dihydropyridine for AI.  Tapering up to 5mg  . Will aim for very tight BP control. Now on 10mg.  Pt admits to an anxiety component and likes the BID dosing of the Labetolol 100mg. All meds renewed.  Home readings good. Takes 50 BID + Norvasc 2.5 + Losartan 100 mg.  Pt to record home readings.  Off Norvasc, will add HCTZ 12.5 MWF as BP up slightly and labile.  Pt to record.\par \par HLD- Continue Pravachol, h/o muscle cramps on Lipitor and Crestor but not a h/o myositis.  On Pravachol 20mg TC 97, HDL 35, TG 72 and LDL 48.  LDL 69 Dec 2021.\par \par ASHD- CCS 2007 was 85, 64th %ile.  CTA revealed mild narrowing of the Cx and RCA.  Will eventually have pt perform a stress test.  Continue Pravachol.  Stress echo ordered.  Not done.  Update CCS to be followed by a stress test. New CCS = 1770, 92nd %ile.  Resume ASA 81 mg. Nuclear stress test ordered. ,  NL March 2022

## 2023-03-27 NOTE — REASON FOR VISIT
[FreeTextEntry1] : 3 issues:\par \par 1-Severe AI on an echo from 6/28/18. There is an echo from 1/2014 done by Dr Sellers that reveals mild to moderate AI.\par                                 2014 6/2018 1/2019\par EF                              60                          55-60                        60-65\par LV                            42/31                      50/33                         48/30  \par AI                           mild-mod                   severe                     mild-mod \par PHT                              -                          799                             590 \par \par 2-HTN is very labile with an emotional/stress component\par 3-ASHD documented on a CTA from 2007, CCS was 85, 64th %ile with mild RCA and Cx dz\par \par 6/23/20 declining functional status, harder biking. Dizziness when getting up. Mild ankle edema.\par Not working since March 29, 2020.  If patient were to return to work he would be at increased risk of Covid complications should he contract it.  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  Also, he is 70 years of age.\par 12/7/21  Pt has tapered a lot of BP meds down.  Amlodipine is 5 mg and Labetolol is 50 qD\par 3/8/22 LDL 69, A1c 5.6, new CCS 1770 !!  He takes 2.5 Amlodipine.\par 3/27/23  He stopped Amlodipine due to edema. BP up initially but now improving.  \par \par EKG: NSR, normal axis and intervals, possible LVH by lead I, diffuse J pt elevation most likely a NL variant.  7/18/19, 6/23/20

## 2023-03-27 NOTE — PHYSICAL EXAM
[Well Developed] : well developed [Well Nourished] : well nourished [No Acute Distress] : no acute distress [Normal Conjunctiva] : normal conjunctiva [Normal Venous Pressure] : normal venous pressure [No Carotid Bruit] : no carotid bruit [Normal S1, S2] : normal S1, S2 [No Rub] : no rub [No Gallop] : no gallop [Clear Lung Fields] : clear lung fields [Good Air Entry] : good air entry [No Respiratory Distress] : no respiratory distress  [Soft] : abdomen soft [Non Tender] : non-tender [No Masses/organomegaly] : no masses/organomegaly [Normal Bowel Sounds] : normal bowel sounds [Normal Gait] : normal gait [No Edema] : no edema [No Cyanosis] : no cyanosis [No Clubbing] : no clubbing [No Varicosities] : no varicosities [No Rash] : no rash [No Skin Lesions] : no skin lesions [Moves all extremities] : moves all extremities [No Focal Deficits] : no focal deficits [Normal Speech] : normal speech [Alert and Oriented] : alert and oriented [Normal memory] : normal memory [de-identified] : 2/6 SEVEN, NO PACHECO MURMUR [de-identified] : NOT BOUNDING PULSES

## 2023-10-25 ENCOUNTER — APPOINTMENT (OUTPATIENT)
Dept: HEART AND VASCULAR | Facility: CLINIC | Age: 73
End: 2023-10-25
Payer: MEDICARE

## 2023-10-25 VITALS
BODY MASS INDEX: 25.01 KG/M2 | HEART RATE: 51 BPM | SYSTOLIC BLOOD PRESSURE: 125 MMHG | OXYGEN SATURATION: 97 % | HEIGHT: 68 IN | DIASTOLIC BLOOD PRESSURE: 68 MMHG | WEIGHT: 165 LBS | TEMPERATURE: 97.9 F

## 2023-10-25 VITALS
BODY MASS INDEX: 25.03 KG/M2 | HEIGHT: 68 IN | OXYGEN SATURATION: 97 % | HEART RATE: 51 BPM | DIASTOLIC BLOOD PRESSURE: 68 MMHG | SYSTOLIC BLOOD PRESSURE: 125 MMHG | WEIGHT: 165.13 LBS | TEMPERATURE: 97.9 F

## 2023-10-25 DIAGNOSIS — R93.1 ABNORMAL FINDINGS ON DIAGNOSTIC IMAGING OF HEART AND CORONARY CIRCULATION: ICD-10-CM

## 2023-10-25 PROCEDURE — 36415 COLL VENOUS BLD VENIPUNCTURE: CPT

## 2023-10-25 PROCEDURE — 99214 OFFICE O/P EST MOD 30 MIN: CPT

## 2023-10-25 RX ORDER — PRAVASTATIN SODIUM 20 MG/1
20 TABLET ORAL
Qty: 90 | Refills: 3 | Status: ACTIVE | COMMUNITY
Start: 1900-01-01 | End: 1900-01-01

## 2023-10-25 RX ORDER — HYDROCHLOROTHIAZIDE 12.5 MG/1
12.5 CAPSULE ORAL
Qty: 90 | Refills: 3 | Status: ACTIVE | COMMUNITY
Start: 2023-03-27 | End: 1900-01-01

## 2023-10-25 RX ORDER — LOSARTAN POTASSIUM 100 MG/1
100 TABLET, FILM COATED ORAL
Qty: 90 | Refills: 3 | Status: ACTIVE | COMMUNITY
Start: 2023-10-25 | End: 1900-01-01

## 2023-10-25 RX ORDER — LABETALOL HYDROCHLORIDE 200 MG/1
200 TABLET, FILM COATED ORAL TWICE DAILY
Qty: 180 | Refills: 3 | Status: ACTIVE | COMMUNITY
Start: 1900-01-01 | End: 1900-01-01

## 2023-10-26 ENCOUNTER — APPOINTMENT (OUTPATIENT)
Dept: OTOLARYNGOLOGY | Facility: CLINIC | Age: 73
End: 2023-10-26
Payer: MEDICARE

## 2023-10-26 VITALS
TEMPERATURE: 97.7 F | BODY MASS INDEX: 24.71 KG/M2 | DIASTOLIC BLOOD PRESSURE: 98 MMHG | HEART RATE: 52 BPM | WEIGHT: 163 LBS | SYSTOLIC BLOOD PRESSURE: 184 MMHG | HEIGHT: 68 IN | OXYGEN SATURATION: 100 %

## 2023-10-26 DIAGNOSIS — H90.3 SENSORINEURAL HEARING LOSS, BILATERAL: ICD-10-CM

## 2023-10-26 DIAGNOSIS — Z82.2 FAMILY HISTORY OF DEAFNESS AND HEARING LOSS: ICD-10-CM

## 2023-10-26 DIAGNOSIS — H61.23 IMPACTED CERUMEN, BILATERAL: ICD-10-CM

## 2023-10-26 LAB
ALBUMIN SERPL ELPH-MCNC: 4.5 G/DL
ALP BLD-CCNC: 63 U/L
ALT SERPL-CCNC: 19 U/L
ANION GAP SERPL CALC-SCNC: 13 MMOL/L
AST SERPL-CCNC: 22 U/L
BASOPHILS # BLD AUTO: 0.04 K/UL
BASOPHILS NFR BLD AUTO: 0.6 %
BILIRUB SERPL-MCNC: 0.5 MG/DL
BUN SERPL-MCNC: 23 MG/DL
CALCIUM SERPL-MCNC: 9.2 MG/DL
CHLORIDE SERPL-SCNC: 105 MMOL/L
CHOLEST SERPL-MCNC: 107 MG/DL
CO2 SERPL-SCNC: 24 MMOL/L
CREAT SERPL-MCNC: 0.96 MG/DL
EGFR: 84 ML/MIN/1.73M2
EOSINOPHIL # BLD AUTO: 0.11 K/UL
EOSINOPHIL NFR BLD AUTO: 1.7 %
ESTIMATED AVERAGE GLUCOSE: 123 MG/DL
GLUCOSE SERPL-MCNC: 98 MG/DL
HBA1C MFR BLD HPLC: 5.9 %
HCT VFR BLD CALC: 41.7 %
HDLC SERPL-MCNC: 39 MG/DL
HGB BLD-MCNC: 13.7 G/DL
IMM GRANULOCYTES NFR BLD AUTO: 0.3 %
LDLC SERPL CALC-MCNC: 53 MG/DL
LYMPHOCYTES # BLD AUTO: 2.24 K/UL
LYMPHOCYTES NFR BLD AUTO: 34.6 %
MAN DIFF?: NORMAL
MCHC RBC-ENTMCNC: 30.2 PG
MCHC RBC-ENTMCNC: 32.9 GM/DL
MCV RBC AUTO: 91.9 FL
MONOCYTES # BLD AUTO: 0.53 K/UL
MONOCYTES NFR BLD AUTO: 8.2 %
NEUTROPHILS # BLD AUTO: 3.53 K/UL
NEUTROPHILS NFR BLD AUTO: 54.6 %
NONHDLC SERPL-MCNC: 67 MG/DL
PLATELET # BLD AUTO: 192 K/UL
POTASSIUM SERPL-SCNC: 4.5 MMOL/L
PROT SERPL-MCNC: 7.2 G/DL
PSA SERPL-MCNC: 0.53 NG/ML
RBC # BLD: 4.54 M/UL
RBC # FLD: 13.8 %
SODIUM SERPL-SCNC: 142 MMOL/L
TRIGL SERPL-MCNC: 64 MG/DL
WBC # FLD AUTO: 6.47 K/UL

## 2023-10-26 PROCEDURE — G0268 REMOVAL OF IMPACTED WAX MD: CPT

## 2023-10-26 PROCEDURE — 99203 OFFICE O/P NEW LOW 30 MIN: CPT | Mod: 25

## 2023-10-26 PROCEDURE — 92550 TYMPANOMETRY & REFLEX THRESH: CPT | Mod: 52

## 2023-10-26 PROCEDURE — 92557 COMPREHENSIVE HEARING TEST: CPT

## 2023-11-06 ENCOUNTER — TRANSCRIPTION ENCOUNTER (OUTPATIENT)
Age: 73
End: 2023-11-06

## 2024-03-15 ENCOUNTER — NON-APPOINTMENT (OUTPATIENT)
Age: 74
End: 2024-03-15

## 2024-03-15 ENCOUNTER — APPOINTMENT (OUTPATIENT)
Dept: HEART AND VASCULAR | Facility: CLINIC | Age: 74
End: 2024-03-15
Payer: MEDICARE

## 2024-03-15 VITALS
WEIGHT: 159 LBS | DIASTOLIC BLOOD PRESSURE: 57 MMHG | HEIGHT: 68 IN | HEART RATE: 55 BPM | TEMPERATURE: 98 F | SYSTOLIC BLOOD PRESSURE: 120 MMHG | BODY MASS INDEX: 24.1 KG/M2 | OXYGEN SATURATION: 98 %

## 2024-03-15 DIAGNOSIS — I35.1 NONRHEUMATIC AORTIC (VALVE) INSUFFICIENCY: ICD-10-CM

## 2024-03-15 DIAGNOSIS — I10 ESSENTIAL (PRIMARY) HYPERTENSION: ICD-10-CM

## 2024-03-15 DIAGNOSIS — E78.00 PURE HYPERCHOLESTEROLEMIA, UNSPECIFIED: ICD-10-CM

## 2024-03-15 DIAGNOSIS — I25.10 ATHEROSCLEROTIC HEART DISEASE OF NATIVE CORONARY ARTERY W/OUT ANGINA PECTORIS: ICD-10-CM

## 2024-03-15 PROCEDURE — 99214 OFFICE O/P EST MOD 30 MIN: CPT

## 2024-03-15 PROCEDURE — 93000 ELECTROCARDIOGRAM COMPLETE: CPT

## 2024-03-15 NOTE — HISTORY OF PRESENT ILLNESS
[FreeTextEntry1] : PCP- Pepe Skinner Neuro- Dr Doroteo Bustos Derm- Dr Valladares, Dr Abdoulaye BURTON- None at present

## 2024-03-15 NOTE — REASON FOR VISIT
[FreeTextEntry1] : 3 issues:  1-Severe AI on an echo from 6/28/18. There is an echo from 1/2014 done by Dr Sellers that reveals mild to moderate AI.                                 2014                       6/2018                      1/2019                                           10/2023 EF                              60                          55-60                        60-65                                                 62   LV                            42/31                      50/33                         48/30                                                54/31   AI                           mild-mod                   severe                     mild-mod                                         mild-mod PHT                              -                          799                             590                                                     -    2-HTN is very labile with an emotional/stress component 3-ASHD documented on a CTA from 2007, CCS was 85, 64th %ile with mild RCA and Cx dz  6/23/20 declining functional status, harder biking. Dizziness when getting up. Mild ankle edema. Not working since March 29, 2020.  If patient were to return to work he would be at increased risk of Covid complications should he contract it.  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  Also, he is 70 years of age. 12/7/21  Pt has tapered a lot of BP meds down.  Amlodipine is 5 mg and Labetolol is 50 qD 3/8/22 LDL 69, A1c 5.6, new CCS 1770 !!  He takes 2.5 Amlodipine. 3/27/23  He stopped Amlodipine due to edema. BP up initially but now improving.   3/15/24 BP excellent.   Labetalol is 200 BID. Memory issues, evaluated at [facility], processing speed is down. Covid fall 2023  EKG: NSR, normal axis and intervals, possible LVH by lead I, diffuse J pt elevation most likely a NL variant.  7/18/19, 6/23/20

## 2024-03-15 NOTE — PHYSICAL EXAM
[Well Developed] : well developed [Well Nourished] : well nourished [No Acute Distress] : no acute distress [Normal Conjunctiva] : normal conjunctiva [Normal Venous Pressure] : normal venous pressure [No Carotid Bruit] : no carotid bruit [Normal S1, S2] : normal S1, S2 [No Rub] : no rub [No Gallop] : no gallop [Clear Lung Fields] : clear lung fields [Good Air Entry] : good air entry [No Respiratory Distress] : no respiratory distress  [Soft] : abdomen soft [Non Tender] : non-tender [No Masses/organomegaly] : no masses/organomegaly [Normal Bowel Sounds] : normal bowel sounds [Normal Gait] : normal gait [No Edema] : no edema [No Cyanosis] : no cyanosis [No Clubbing] : no clubbing [No Varicosities] : no varicosities [No Rash] : no rash [No Skin Lesions] : no skin lesions [Moves all extremities] : moves all extremities [No Focal Deficits] : no focal deficits [Normal Speech] : normal speech [Alert and Oriented] : alert and oriented [Normal memory] : normal memory [de-identified] : 1/6 SEVEN, NO PACHECO MURMUR [de-identified] : NOT BOUNDING PULSES

## 2024-03-15 NOTE — ASSESSMENT
[FreeTextEntry1] : AI- Mild to moderate in 1/2014, now reported as severe in 6/26/2018 with mild MR and a thickened/sclerotic aortic valve, MARY 28, EF 55-60. f/u echo at 6 mos reveals probably mild or mild/mod AI. Echo 7/18/19 mild AI. Update echo in 2022.. Mild to moderate AI.  Echo 10/2023 unchanged, LVEDD 54.  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 Pt to pursue this and I agree. HE HAS NOW RETIRED..  HTN- very labile, will add Amlodipine 2.5mg/Day. Some literature favors use of a dihydropyridine for AI. Tapering up to 5mg. Will aim for very tight BP control. Now on 10mg. Pt admits to an anxiety component and likes the BID dosing of the Labetalol 100mg. All meds renewed. Home readings good. Takes 50 BID + Norvasc 2.5 + Losartan 100 mg. Pt to record home readings. Off Norvasc, will add HCTZ 12.5 MWF as BP up slightly and labile. Pt to record.  BP excellent.  HLD- Continue Pravachol, h/o muscle cramps on Lipitor and Crestor but not a h/o myositis. On Pravachol 20mg TC 97, HDL 35, TG 72 and LDL 48. LDL 69 Dec 2021.  Labs were drawn today in Office.   ASHD- CCS 2007 was 85, 64th %ile. CTA revealed mild narrowing of the Cx and RCA. Will eventually have pt perform a stress test. Continue Pravachol. Stress echo ordered. Not done. Update CCS to be followed by a stress test. New CCS = 1770!!!, 92nd %ile. Resume ASA 81 mg. Nuclear stress test ordered. , NL March 2022.

## 2024-10-07 ENCOUNTER — RX RENEWAL (OUTPATIENT)
Age: 74
End: 2024-10-07

## 2024-10-21 ENCOUNTER — APPOINTMENT (OUTPATIENT)
Dept: HEART AND VASCULAR | Facility: CLINIC | Age: 74
End: 2024-10-21
Payer: MEDICARE

## 2024-10-21 VITALS
HEART RATE: 58 BPM | BODY MASS INDEX: 24.4 KG/M2 | TEMPERATURE: 98 F | SYSTOLIC BLOOD PRESSURE: 130 MMHG | OXYGEN SATURATION: 98 % | WEIGHT: 161 LBS | HEIGHT: 68 IN | DIASTOLIC BLOOD PRESSURE: 79 MMHG

## 2024-10-21 DIAGNOSIS — I35.1 NONRHEUMATIC AORTIC (VALVE) INSUFFICIENCY: ICD-10-CM

## 2024-10-21 DIAGNOSIS — I25.10 ATHEROSCLEROTIC HEART DISEASE OF NATIVE CORONARY ARTERY W/OUT ANGINA PECTORIS: ICD-10-CM

## 2024-10-21 DIAGNOSIS — E78.00 PURE HYPERCHOLESTEROLEMIA, UNSPECIFIED: ICD-10-CM

## 2024-10-21 DIAGNOSIS — I10 ESSENTIAL (PRIMARY) HYPERTENSION: ICD-10-CM

## 2024-10-21 DIAGNOSIS — R93.1 ABNORMAL FINDINGS ON DIAGNOSTIC IMAGING OF HEART AND CORONARY CIRCULATION: ICD-10-CM

## 2024-10-21 PROCEDURE — 99213 OFFICE O/P EST LOW 20 MIN: CPT

## 2024-11-12 ENCOUNTER — TRANSCRIPTION ENCOUNTER (OUTPATIENT)
Age: 74
End: 2024-11-12

## 2024-11-12 DIAGNOSIS — R00.1 BRADYCARDIA, UNSPECIFIED: ICD-10-CM

## 2024-11-13 ENCOUNTER — APPOINTMENT (OUTPATIENT)
Dept: HEART AND VASCULAR | Facility: CLINIC | Age: 74
End: 2024-11-13

## 2024-11-13 PROCEDURE — 93242 EXT ECG>48HR<7D RECORDING: CPT

## 2024-11-27 PROCEDURE — 93244 EXT ECG>48HR<7D REV&INTERPJ: CPT

## 2025-01-09 ENCOUNTER — NON-APPOINTMENT (OUTPATIENT)
Age: 75
End: 2025-01-09

## 2025-01-09 ENCOUNTER — APPOINTMENT (OUTPATIENT)
Dept: HEART AND VASCULAR | Facility: CLINIC | Age: 75
End: 2025-01-09
Payer: MEDICARE

## 2025-01-09 VITALS
SYSTOLIC BLOOD PRESSURE: 125 MMHG | TEMPERATURE: 97.8 F | BODY MASS INDEX: 23.95 KG/M2 | OXYGEN SATURATION: 99 % | HEART RATE: 68 BPM | HEIGHT: 68 IN | DIASTOLIC BLOOD PRESSURE: 66 MMHG | WEIGHT: 158 LBS

## 2025-01-09 DIAGNOSIS — E78.00 PURE HYPERCHOLESTEROLEMIA, UNSPECIFIED: ICD-10-CM

## 2025-01-09 DIAGNOSIS — R93.1 ABNORMAL FINDINGS ON DIAGNOSTIC IMAGING OF HEART AND CORONARY CIRCULATION: ICD-10-CM

## 2025-01-09 DIAGNOSIS — I35.1 NONRHEUMATIC AORTIC (VALVE) INSUFFICIENCY: ICD-10-CM

## 2025-01-09 DIAGNOSIS — I25.10 ATHEROSCLEROTIC HEART DISEASE OF NATIVE CORONARY ARTERY W/OUT ANGINA PECTORIS: ICD-10-CM

## 2025-01-09 DIAGNOSIS — I10 ESSENTIAL (PRIMARY) HYPERTENSION: ICD-10-CM

## 2025-01-09 PROCEDURE — 99214 OFFICE O/P EST MOD 30 MIN: CPT

## 2025-01-09 RX ORDER — LORAZEPAM 1 MG/1
1 TABLET ORAL
Qty: 60 | Refills: 0 | Status: ACTIVE | COMMUNITY
Start: 2025-01-09

## 2025-01-09 RX ORDER — HYDROCHLOROTHIAZIDE 12.5 MG/1
12.5 CAPSULE ORAL
Qty: 90 | Refills: 3 | Status: ACTIVE | COMMUNITY
Start: 2025-01-09 | End: 1900-01-01

## 2025-07-30 ENCOUNTER — APPOINTMENT (OUTPATIENT)
Dept: HEART AND VASCULAR | Facility: CLINIC | Age: 75
End: 2025-07-30
Payer: MEDICARE

## 2025-07-30 ENCOUNTER — NON-APPOINTMENT (OUTPATIENT)
Age: 75
End: 2025-07-30

## 2025-07-30 VITALS
BODY MASS INDEX: 24.25 KG/M2 | TEMPERATURE: 98 F | HEART RATE: 52 BPM | OXYGEN SATURATION: 97 % | HEIGHT: 68 IN | WEIGHT: 160 LBS | SYSTOLIC BLOOD PRESSURE: 128 MMHG | DIASTOLIC BLOOD PRESSURE: 60 MMHG

## 2025-07-30 DIAGNOSIS — E78.00 PURE HYPERCHOLESTEROLEMIA, UNSPECIFIED: ICD-10-CM

## 2025-07-30 DIAGNOSIS — I35.1 NONRHEUMATIC AORTIC (VALVE) INSUFFICIENCY: ICD-10-CM

## 2025-07-30 DIAGNOSIS — I10 ESSENTIAL (PRIMARY) HYPERTENSION: ICD-10-CM

## 2025-07-30 DIAGNOSIS — I25.10 ATHEROSCLEROTIC HEART DISEASE OF NATIVE CORONARY ARTERY W/OUT ANGINA PECTORIS: ICD-10-CM

## 2025-07-30 PROCEDURE — 99214 OFFICE O/P EST MOD 30 MIN: CPT

## 2025-07-30 PROCEDURE — 93000 ELECTROCARDIOGRAM COMPLETE: CPT
